# Patient Record
Sex: MALE | Race: OTHER | URBAN - METROPOLITAN AREA
[De-identification: names, ages, dates, MRNs, and addresses within clinical notes are randomized per-mention and may not be internally consistent; named-entity substitution may affect disease eponyms.]

---

## 2021-08-25 ENCOUNTER — OFFICE (OUTPATIENT)
Dept: URBAN - METROPOLITAN AREA CLINIC 102 | Facility: CLINIC | Age: 71
End: 2021-08-25

## 2021-08-25 PROBLEM — Z86.010 PERSONAL HISTORY OF COLON POLYPS: Status: ACTIVE | Noted: 2021-08-25

## 2021-08-25 PROCEDURE — 00038: CPT | Performed by: INTERNAL MEDICINE

## 2021-10-26 ENCOUNTER — ON CAMPUS - OUTPATIENT (OUTPATIENT)
Dept: URBAN - METROPOLITAN AREA HOSPITAL 37 | Facility: HOSPITAL | Age: 71
End: 2021-10-26

## 2021-10-26 DIAGNOSIS — Z86.010 PERSONAL HISTORY OF COLONIC POLYPS: ICD-10-CM

## 2021-10-26 DIAGNOSIS — K63.5 POLYP OF COLON: ICD-10-CM

## 2021-10-26 PROCEDURE — 45380 COLONOSCOPY AND BIOPSY: CPT | Performed by: INTERNAL MEDICINE

## 2023-09-29 ENCOUNTER — OFFICE (OUTPATIENT)
Dept: URBAN - METROPOLITAN AREA CLINIC 102 | Facility: CLINIC | Age: 73
End: 2023-09-29

## 2023-09-29 VITALS
HEART RATE: 80 BPM | TEMPERATURE: 97.7 F | SYSTOLIC BLOOD PRESSURE: 151 MMHG | DIASTOLIC BLOOD PRESSURE: 98 MMHG | HEIGHT: 67 IN | WEIGHT: 160 LBS

## 2023-09-29 DIAGNOSIS — R11.2 NAUSEA WITH VOMITING, UNSPECIFIED: ICD-10-CM

## 2023-09-29 DIAGNOSIS — R14.2 ERUCTATION: ICD-10-CM

## 2023-09-29 DIAGNOSIS — R41.3 OTHER AMNESIA: ICD-10-CM

## 2023-09-29 DIAGNOSIS — R10.13 EPIGASTRIC PAIN: ICD-10-CM

## 2023-09-29 PROCEDURE — 99214 OFFICE O/P EST MOD 30 MIN: CPT | Performed by: PHYSICIAN ASSISTANT

## 2023-09-29 RX ORDER — PANTOPRAZOLE 40 MG/1
40 TABLET, DELAYED RELEASE ORAL
Qty: 60 | Refills: 2 | Status: ACTIVE
Start: 2023-09-29

## 2023-09-29 NOTE — SERVICENOTES
I personally discussed the procedure with the patient, including the risks, benefits, and alternatives of EGD.

## 2023-09-29 NOTE — SERVICEHPINOTES
Mr. Motta is a 73 YoM  with history of global amnesia, HTN, HLD, who presents to the office for evaluation of acute onset dyspeptic symptoms. 2 months ago he started having severe heartburn and frequent episodes of N/V occurring when eating. He remains uncomfortable even between meals, but does not have significant nocturnal symptoms.  No recent trial of acid suppression but he has been on PPIs in the past for reported PUD. He tries to avoid spicy/acidic foods, does not notice increase in symptoms with any particular items. He reports regular bowel habits. No rectal bleeding or black stools. Weight has been stable. He had colonoscopy in 2021 with finding of adenomatous polyps. 
br
brLast month he had a repeat episode of amnesia for which he was seen at [facility] ER and had negative head CT. Labs at that time noted for normal LFTs, Cr, and lytes. He has hx of global amnesia for which he has been evaluated by neurology and cardiology in the past. Had reportedly negative MRI in 2019 -- no hx of stroke. He has previously seen Dr. Monteiro. He continues on medication for HTN. br

## 2023-10-24 ENCOUNTER — ON CAMPUS - OUTPATIENT (OUTPATIENT)
Dept: URBAN - METROPOLITAN AREA HOSPITAL 16 | Facility: HOSPITAL | Age: 73
End: 2023-10-24

## 2023-10-24 DIAGNOSIS — K29.50 UNSPECIFIED CHRONIC GASTRITIS WITHOUT BLEEDING: ICD-10-CM

## 2023-10-24 DIAGNOSIS — K20.80 OTHER ESOPHAGITIS WITHOUT BLEEDING: ICD-10-CM

## 2023-10-24 PROCEDURE — 43239 EGD BIOPSY SINGLE/MULTIPLE: CPT | Performed by: INTERNAL MEDICINE
